# Patient Record
Sex: FEMALE | URBAN - METROPOLITAN AREA
[De-identification: names, ages, dates, MRNs, and addresses within clinical notes are randomized per-mention and may not be internally consistent; named-entity substitution may affect disease eponyms.]

---

## 2021-03-17 ENCOUNTER — APPOINTMENT (OUTPATIENT)
Dept: URBAN - METROPOLITAN AREA SURGERY 9 | Age: 41
Setting detail: DERMATOLOGY
End: 2021-03-18

## 2021-03-17 DIAGNOSIS — L81.4 OTHER MELANIN HYPERPIGMENTATION: ICD-10-CM

## 2021-03-17 DIAGNOSIS — D22 MELANOCYTIC NEVI: ICD-10-CM

## 2021-03-17 PROBLEM — D22.39 MELANOCYTIC NEVI OF OTHER PARTS OF FACE: Status: ACTIVE | Noted: 2021-03-17

## 2021-03-17 PROCEDURE — OTHER PRODUCT LINE (HYPERPIGMENTATION): OTHER

## 2021-03-17 PROCEDURE — OTHER MIPS QUALITY: OTHER

## 2021-03-17 PROCEDURE — OTHER COSMETIC SHAVE REMOVAL: OTHER

## 2021-03-17 PROCEDURE — OTHER PATHOLOGY BILLING (COSMETIC): OTHER

## 2021-03-17 PROCEDURE — 99202 OFFICE O/P NEW SF 15 MIN: CPT

## 2021-03-17 PROCEDURE — OTHER COUNSELING: OTHER

## 2021-03-17 PROCEDURE — OTHER SUNSCREEN RECOMMENDATIONS: OTHER

## 2021-03-17 ASSESSMENT — LOCATION DETAILED DESCRIPTION DERM
LOCATION DETAILED: RIGHT CENTRAL MALAR CHEEK
LOCATION DETAILED: RIGHT CHIN

## 2021-03-17 ASSESSMENT — LOCATION SIMPLE DESCRIPTION DERM
LOCATION SIMPLE: CHIN
LOCATION SIMPLE: RIGHT CHEEK

## 2021-03-17 ASSESSMENT — LOCATION ZONE DERM: LOCATION ZONE: FACE

## 2021-03-17 NOTE — PROCEDURE: PATHOLOGY BILLING (COSMETIC)
Price (Use Numbers Only, No Special Characters Or $): 68 Price (Use Numbers Only, No Special Characters Or $): 05

## 2021-03-17 NOTE — PROCEDURE: PRODUCT LINE (HYPERPIGMENTATION)
Product 1 Application Directions: Apply twice daily to brown areas of the face for up to 3 months. Take 1 month off. Repeat as needed \\n1 tube 2RF\\n\\nProduct #1516\\nLot:[id number]\\nExp: 11/2022 Product 1 Application Directions: Apply twice daily to brown areas of the face for up to 3 months. Take 1 month off. Repeat as needed \\n1 tube 2RF\\n\\nProduct #1516\\nLot:[id number]\\nExp: 11/2022

## 2021-03-17 NOTE — PROCEDURE: PRODUCT LINE (HYPERPIGMENTATION)
Assigning Risk Information: Per AMA, level of risk is based upon consequences of the problem(s) addressed at the encounter when appropriately treated. Risk also includes medical decision making related to the need to initiate or forego further testing, treatment and/or hospitalization. Over the counter medication are assigned a risk level of low. Prescription medication management is assigned a risk level of moderate.

## 2021-03-17 NOTE — PROCEDURE: SUNSCREEN RECOMMENDATIONS
General Sunscreen Counseling: Sunscreen (SPF 30+ or higher) and photoprotection advised
Detail Level: Zone